# Patient Record
Sex: FEMALE | Race: WHITE | NOT HISPANIC OR LATINO | Employment: OTHER | URBAN - METROPOLITAN AREA
[De-identification: names, ages, dates, MRNs, and addresses within clinical notes are randomized per-mention and may not be internally consistent; named-entity substitution may affect disease eponyms.]

---

## 2021-11-19 ENCOUNTER — NEW PATIENT EMERGENCY (OUTPATIENT)
Dept: URBAN - METROPOLITAN AREA CLINIC 6 | Facility: CLINIC | Age: 71
End: 2021-11-19

## 2021-11-19 DIAGNOSIS — H01.02A: ICD-10-CM

## 2021-11-19 PROCEDURE — 92002 INTRM OPH EXAM NEW PATIENT: CPT

## 2021-11-19 ASSESSMENT — VISUAL ACUITY
OU_CC: J1
OS_CC: 20/40
OU_PH: 20/40
OU_CC: 20/40
OD_CC: 20/40

## 2021-11-19 ASSESSMENT — TONOMETRY
OS_IOP_MMHG: 10
OD_IOP_MMHG: 13

## 2021-12-14 ENCOUNTER — FOLLOW UP (OUTPATIENT)
Dept: URBAN - METROPOLITAN AREA CLINIC 14 | Facility: CLINIC | Age: 71
End: 2021-12-14

## 2021-12-14 DIAGNOSIS — H01.02A: ICD-10-CM

## 2021-12-14 PROCEDURE — 92012 INTRM OPH EXAM EST PATIENT: CPT

## 2021-12-14 ASSESSMENT — TONOMETRY
OS_IOP_MMHG: 11
OD_IOP_MMHG: 13

## 2021-12-14 ASSESSMENT — VISUAL ACUITY
OU_CC: 20/25
OS_CC: 20/40
OD_CC: 20/40-1

## 2022-01-05 ENCOUNTER — FOLLOW UP (OUTPATIENT)
Dept: URBAN - METROPOLITAN AREA CLINIC 14 | Facility: CLINIC | Age: 72
End: 2022-01-05

## 2022-01-05 DIAGNOSIS — H01.02A: ICD-10-CM

## 2022-01-05 DIAGNOSIS — Z96.1: ICD-10-CM

## 2022-01-05 PROCEDURE — 92015 DETERMINE REFRACTIVE STATE: CPT

## 2022-01-05 PROCEDURE — 92012 INTRM OPH EXAM EST PATIENT: CPT

## 2022-01-05 ASSESSMENT — TONOMETRY
OD_IOP_MMHG: 14
OS_IOP_MMHG: 11

## 2022-01-05 ASSESSMENT — VISUAL ACUITY
OD_CC: 20/30
OS_CC: 20/40

## 2022-03-10 ENCOUNTER — ESTABLISHED PATIENT (OUTPATIENT)
Dept: URBAN - METROPOLITAN AREA CLINIC 6 | Facility: CLINIC | Age: 72
End: 2022-03-10

## 2022-03-10 DIAGNOSIS — H01.02A: ICD-10-CM

## 2022-03-10 DIAGNOSIS — Z96.1: ICD-10-CM

## 2022-03-10 PROCEDURE — 92015 DETERMINE REFRACTIVE STATE: CPT

## 2022-03-10 PROCEDURE — 92014 COMPRE OPH EXAM EST PT 1/>: CPT

## 2022-03-10 ASSESSMENT — TONOMETRY
OS_IOP_MMHG: 10
OD_IOP_MMHG: 10

## 2022-06-19 RX ORDER — LOSARTAN POTASSIUM 25 MG/1
TABLET ORAL
COMMUNITY
Start: 2022-02-16 | End: 2022-08-08

## 2022-06-19 RX ORDER — OMEPRAZOLE 40 MG/1
CAPSULE, DELAYED RELEASE ORAL
COMMUNITY

## 2022-06-19 RX ORDER — ALBUTEROL SULFATE 90 UG/1
AEROSOL, METERED RESPIRATORY (INHALATION)
COMMUNITY
End: 2022-08-08

## 2022-06-19 RX ORDER — FENOFIBRATE 160 MG/1
TABLET ORAL
COMMUNITY

## 2022-06-19 RX ORDER — MAGNESIUM OXIDE 400 MG/1
TABLET ORAL
COMMUNITY
Start: 2022-02-03

## 2022-06-19 RX ORDER — ROSUVASTATIN CALCIUM 10 MG/1
TABLET, COATED ORAL
COMMUNITY

## 2022-06-19 RX ORDER — DULOXETIN HYDROCHLORIDE 60 MG/1
1 CAPSULE, DELAYED RELEASE ORAL
COMMUNITY

## 2022-06-19 RX ORDER — CARVEDILOL 6.25 MG/1
6.25 TABLET ORAL
COMMUNITY

## 2022-06-19 RX ORDER — IPRATROPIUM BROMIDE AND ALBUTEROL SULFATE 2.5; .5 MG/3ML; MG/3ML
SOLUTION RESPIRATORY (INHALATION)
COMMUNITY
Start: 2022-04-01

## 2022-07-18 ENCOUNTER — CONSULTATION/EVALUATION (OUTPATIENT)
Dept: URBAN - METROPOLITAN AREA CLINIC 14 | Facility: CLINIC | Age: 72
End: 2022-07-18

## 2022-07-18 DIAGNOSIS — H52.7: ICD-10-CM

## 2022-07-18 PROCEDURE — 99499LKFN LASIK CONSULT NON CANDIDATE

## 2022-07-18 ASSESSMENT — VISUAL ACUITY
OS_CC: 20/40
OD_SC: 20/200
OS_SC: 20/40
OD_CC: 20/25

## 2022-07-18 ASSESSMENT — KERATOMETRY
OD_AXISANGLE_DEGREES: 102
OS_AXISANGLE2_DEGREES: 175
OD_K1POWER_DIOPTERS: 45.25
OS_AXISANGLE_DEGREES: 85
OD_K2POWER_DIOPTERS: 46.75
OS_K1POWER_DIOPTERS: 45.75
OS_K2POWER_DIOPTERS: 46.25
OD_AXISANGLE2_DEGREES: 12

## 2022-09-06 ENCOUNTER — ESTABLISHED PATIENT (OUTPATIENT)
Dept: URBAN - METROPOLITAN AREA CLINIC 14 | Facility: CLINIC | Age: 72
End: 2022-09-06

## 2022-09-06 DIAGNOSIS — H01.02A: ICD-10-CM

## 2022-09-06 DIAGNOSIS — H04.123: ICD-10-CM

## 2022-09-06 DIAGNOSIS — Z96.1: ICD-10-CM

## 2022-09-06 PROCEDURE — 92012 INTRM OPH EXAM EST PATIENT: CPT

## 2022-09-06 PROCEDURE — 92015 DETERMINE REFRACTIVE STATE: CPT

## 2022-09-06 ASSESSMENT — VISUAL ACUITY
OD_GLARE: 20/30
OU_CC: J2
OS_CC: 20/40
OD_CC: 20/30-1
OS_GLARE: 20/40

## 2022-09-06 ASSESSMENT — KERATOMETRY
OS_K1POWER_DIOPTERS: 45.75
OD_K2POWER_DIOPTERS: 46.75
OS_AXISANGLE2_DEGREES: 175
OD_K1POWER_DIOPTERS: 45.25
OS_AXISANGLE_DEGREES: 85
OD_AXISANGLE2_DEGREES: 12
OD_AXISANGLE_DEGREES: 102
OS_K2POWER_DIOPTERS: 46.25

## 2022-09-06 ASSESSMENT — TONOMETRY
OS_IOP_MMHG: 11
OD_IOP_MMHG: 12

## 2023-02-16 PROBLEM — I48.91 ATRIAL FIBRILLATION (HCC): Status: ACTIVE | Noted: 2019-04-10

## 2023-05-10 ENCOUNTER — ESTABLISHED PATIENT (OUTPATIENT)
Dept: URBAN - METROPOLITAN AREA CLINIC 14 | Facility: CLINIC | Age: 73
End: 2023-05-10

## 2023-05-10 DIAGNOSIS — H04.123: ICD-10-CM

## 2023-05-10 DIAGNOSIS — Z96.1: ICD-10-CM

## 2023-05-10 DIAGNOSIS — H01.02A: ICD-10-CM

## 2023-05-10 PROCEDURE — 92014 COMPRE OPH EXAM EST PT 1/>: CPT

## 2023-05-10 ASSESSMENT — VISUAL ACUITY
OU_CC: 20/20
OD_CC: 20/30
OS_CC: 20/20

## 2023-05-10 ASSESSMENT — KERATOMETRY
OD_AXISANGLE2_DEGREES: 12
OS_K1POWER_DIOPTERS: 45.75
OS_AXISANGLE2_DEGREES: 175
OS_AXISANGLE_DEGREES: 85
OD_AXISANGLE_DEGREES: 102
OD_K2POWER_DIOPTERS: 46.75
OD_K1POWER_DIOPTERS: 45.25
OS_K2POWER_DIOPTERS: 46.25

## 2023-05-10 ASSESSMENT — TONOMETRY
OS_IOP_MMHG: 8
OD_IOP_MMHG: 9

## 2023-06-15 NOTE — PATIENT DISCUSSION
Recommended warm compresses. V-Y Flap Text: The defect edges were debeveled with a #15 scalpel blade.  Given the location of the defect, shape of the defect and the proximity to free margins a V-Y flap was deemed most appropriate.  Using a sterile surgical marker, an appropriate advancement flap was drawn incorporating the defect and placing the expected incisions within the relaxed skin tension lines where possible.    The area thus outlined was incised deep to adipose tissue with a #15 scalpel blade.  The skin margins were undermined to an appropriate distance in all directions utilizing iris scissors.

## 2023-09-01 PROBLEM — I42.9 CARDIOMYOPATHY: Status: ACTIVE | Noted: 2023-03-24

## 2023-09-01 PROBLEM — J43.2 CENTRILOBULAR EMPHYSEMA (HCC): Status: ACTIVE | Noted: 2023-09-01

## 2023-09-01 PROBLEM — J44.9 CHRONIC OBSTRUCTIVE LUNG DISEASE (HCC): Status: ACTIVE | Noted: 2023-09-01

## 2023-09-01 PROBLEM — J18.9 PNEUMONIA DUE TO INFECTIOUS ORGANISM: Status: ACTIVE | Noted: 2018-01-22

## 2023-09-01 PROBLEM — R52 ALTERATION IN COMFORT ASSOCIATED WITH PAIN: Status: ACTIVE | Noted: 2023-09-01

## 2023-09-01 PROBLEM — J30.81 ALLERGIC RHINITIS DUE TO ANIMAL HAIR AND DANDER: Status: ACTIVE | Noted: 2023-09-01

## 2023-09-01 PROBLEM — F40.243 ANXIETY WITH FLYING: Status: ACTIVE | Noted: 2023-09-01

## 2023-09-01 PROBLEM — I25.10 CORONARY ARTERY DISEASE INVOLVING NATIVE CORONARY ARTERY OF NATIVE HEART WITHOUT ANGINA PECTORIS: Status: ACTIVE | Noted: 2023-09-01

## 2023-09-01 PROBLEM — Z15.89: Status: ACTIVE | Noted: 2023-08-03

## 2023-09-01 PROBLEM — J44.89 COPD (CHRONIC OBSTRUCTIVE PULMONARY DISEASE) WITH CHRONIC BRONCHITIS (HCC): Status: ACTIVE | Noted: 2023-09-01

## 2023-09-01 PROBLEM — J32.9 CHRONIC SINUSITIS: Status: ACTIVE | Noted: 2023-09-01

## 2023-09-01 PROBLEM — E83.42 HYPOMAGNESEMIA: Status: ACTIVE | Noted: 2023-09-01

## 2023-09-01 PROBLEM — R09.89 DECREASED CARDIAC OUTPUT: Status: ACTIVE | Noted: 2023-09-01

## 2023-09-01 PROBLEM — Z95.0 PRESENCE OF CARDIAC PACEMAKER: Status: ACTIVE | Noted: 2023-03-26

## 2023-09-01 PROBLEM — M75.02 ADHESIVE CAPSULITIS OF LEFT SHOULDER: Status: ACTIVE | Noted: 2023-09-01

## 2023-09-01 PROBLEM — Z98.890 POSTOPERATIVE NAUSEA AND VOMITING: Status: ACTIVE | Noted: 2023-09-01

## 2023-09-01 PROBLEM — I65.21 STENOSIS OF RIGHT CAROTID ARTERY: Status: ACTIVE | Noted: 2023-09-01

## 2023-09-01 PROBLEM — M20.42 HAMMERTOE OF SECOND TOE OF LEFT FOOT: Status: ACTIVE | Noted: 2023-09-01

## 2023-09-01 PROBLEM — M17.9 OSTEOARTHRITIS OF KNEE: Status: ACTIVE | Noted: 2023-09-01

## 2023-09-01 PROBLEM — I10 ESSENTIAL HYPERTENSION: Status: ACTIVE | Noted: 2023-09-01

## 2023-09-01 PROBLEM — R91.1 PULMONARY NODULE: Status: ACTIVE | Noted: 2023-09-01

## 2023-09-01 PROBLEM — R11.2 POSTOPERATIVE NAUSEA AND VOMITING: Status: ACTIVE | Noted: 2023-09-01

## 2023-09-01 PROBLEM — I11.0 HYPERTENSIVE HEART DISEASE WITH HEART FAILURE (HCC): Status: ACTIVE | Noted: 2023-09-01

## 2023-09-01 PROBLEM — K21.9 GASTRO-ESOPHAGEAL REFLUX DISEASE WITHOUT ESOPHAGITIS: Status: ACTIVE | Noted: 2023-09-01

## 2023-09-01 PROBLEM — Z91.89 AT RISK FOR INFECTION: Status: ACTIVE | Noted: 2023-09-01

## 2023-09-01 PROBLEM — M19.041 PRIMARY OSTEOARTHRITIS, RIGHT HAND: Status: ACTIVE | Noted: 2023-09-01

## 2023-09-01 PROBLEM — A31.9 MYCOBACTERIAL INFECTION, NON-TB: Status: ACTIVE | Noted: 2023-08-29

## 2023-09-01 PROBLEM — E78.5 HYPERLIPIDEMIA: Status: ACTIVE | Noted: 2023-09-01

## 2023-09-01 PROBLEM — M85.80 OSTEOPENIA: Status: ACTIVE | Noted: 2023-09-01

## 2023-09-01 PROBLEM — F51.01 PRIMARY INSOMNIA: Status: ACTIVE | Noted: 2023-09-01

## 2023-09-01 PROBLEM — J30.2 SEASONAL ALLERGIES: Status: ACTIVE | Noted: 2023-09-01

## 2023-09-22 PROBLEM — G56.02 CARPAL TUNNEL SYNDROME, LEFT: Status: ACTIVE | Noted: 2023-09-22

## 2023-09-22 PROBLEM — G56.01 CARPAL TUNNEL SYNDROME, RIGHT: Status: ACTIVE | Noted: 2023-09-22

## 2023-09-22 PROBLEM — G56.22 CUBITAL TUNNEL SYNDROME, LEFT: Status: ACTIVE | Noted: 2023-09-22

## 2024-01-29 PROBLEM — I10 HTN (HYPERTENSION): Status: RESOLVED | Noted: 2023-09-01 | Resolved: 2024-01-29

## 2024-01-29 PROBLEM — I11.0 HYPERTENSIVE HEART DISEASE WITH HEART FAILURE (HCC): Status: RESOLVED | Noted: 2023-09-01 | Resolved: 2024-01-29

## 2024-04-16 ENCOUNTER — ESTABLISHED PATIENT (OUTPATIENT)
Dept: URBAN - METROPOLITAN AREA CLINIC 14 | Facility: CLINIC | Age: 74
End: 2024-04-16

## 2024-04-16 DIAGNOSIS — H04.123: ICD-10-CM

## 2024-04-16 DIAGNOSIS — Z98.890: ICD-10-CM

## 2024-04-16 DIAGNOSIS — A31.2: ICD-10-CM

## 2024-04-16 DIAGNOSIS — H01.02A: ICD-10-CM

## 2024-04-16 DIAGNOSIS — H43.813: ICD-10-CM

## 2024-04-16 DIAGNOSIS — Z96.1: ICD-10-CM

## 2024-04-16 PROCEDURE — 99214 OFFICE O/P EST MOD 30 MIN: CPT

## 2024-04-16 PROCEDURE — 92015 DETERMINE REFRACTIVE STATE: CPT

## 2024-04-16 PROCEDURE — 92134 CPTRZ OPH DX IMG PST SGM RTA: CPT

## 2024-04-16 ASSESSMENT — VISUAL ACUITY
OD_CC: 20/60-1
OS_CC: 20/25
OD_PH: 20/40
OU_CC: 20/25

## 2024-04-16 ASSESSMENT — TONOMETRY
OD_IOP_MMHG: 12
OS_IOP_MMHG: 10

## 2024-04-16 ASSESSMENT — KERATOMETRY
OS_K1POWER_DIOPTERS: 45.75
OS_K2POWER_DIOPTERS: 46.25
OD_AXISANGLE2_DEGREES: 12
OS_AXISANGLE_DEGREES: 85
OD_K2POWER_DIOPTERS: 46.75
OD_K1POWER_DIOPTERS: 45.25
OS_AXISANGLE2_DEGREES: 175
OD_AXISANGLE_DEGREES: 102

## 2025-03-11 PROBLEM — R10.31 ABDOMINAL PAIN, RIGHT LOWER QUADRANT: Status: ACTIVE | Noted: 2025-03-11

## 2025-03-11 PROBLEM — K66.8 PNEUMOPERITONEUM: Status: ACTIVE | Noted: 2025-03-11

## 2025-03-21 ENCOUNTER — TELEPHONE (OUTPATIENT)
Dept: PHARMACY | Facility: CLINIC | Age: 75
End: 2025-03-21

## 2025-03-21 NOTE — TELEPHONE ENCOUNTER
CLINICAL PHARMACY NOTE - [facility] Pharmacy Referral    Patient can be scheduled with:  Team Schedule- General Referrals, etc.    Received a referral from: Care Coordinator to discuss patient’s medications. Called patient to schedule a time to speak with a pharmacist over the telephone.     No answer left VM: Please contact us at  [phone] option 1 to schedule this appointment.    Aida Del Rosario CPhT.   [facility] Clinical   [facility]  Toll free: [phone] Option 1    Patient is located in [place]. Please schedule with [place] Pharmacists, [place] or Mary Anne GABRIEL for licensing purposes.

## 2025-03-21 NOTE — TELEPHONE ENCOUNTER
----- Message from ALIA HERNANDEZ RN sent at 3/21/2025 12:21 PM EDT -----  Requesting referral to PharmD to review medications with patient, as she's concerned about polypharmacy and would like to come off some of them if possible. 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 She wonders if she really needs to take gabapentin.

## 2025-03-27 NOTE — TELEPHONE ENCOUNTER
2nd attempt to contact this patient regarding the previous message    CLINICAL PHARMACY: referral  Patient unavailable at the time of call. Left following message on home TAD: please call back at toll-free [phone] to retrieve previous message.    Patient is located in [place]. Please schedule with [place] Pharmacists, [place] or Mary Anne GABRIEL for licensing purposes.     [employer] message sent to patient.  If unread, letter will be mailed to home.      Nataly Henry CPhT  Population Health    [facility]   [phone] option 1     For Pharmacy Admin Tracking Only    Program: Pop Health  CPA in place:  No  Gap Closed?: No   Time Spent (min): 15

## 2025-03-28 NOTE — TELEPHONE ENCOUNTER
Patient returned call to schedule Pharmacy medication review . She is scheduled for 03.31.25 @ 11:00 am. Patient is located in [place].    Saira Martinez CPhT.  [facility] Clinical   [facility]  Toll free: [phone] Option 1     For Pharmacy Admin Tracking Only    Program: [employer]  CPA in place:  No  Recommendation Provided To: Patient/Caregiver: 1 via Telephone  Intervention Detail: Scheduled Appointment  Intervention Accepted By: Patient/Caregiver: 1  Gap Closed?: Yes   Time Spent (min): 5

## 2025-03-31 ENCOUNTER — TELEPHONE (OUTPATIENT)
Dept: PHARMACY | Facility: CLINIC | Age: 75
End: 2025-03-31

## 2025-03-31 PROBLEM — N18.30 ANEMIA OF CHRONIC KIDNEY FAILURE, STAGE 3 (MODERATE): Status: ACTIVE | Noted: 2025-03-31

## 2025-03-31 PROBLEM — D63.1 ANEMIA OF CHRONIC KIDNEY FAILURE, STAGE 3 (MODERATE): Status: ACTIVE | Noted: 2025-03-31

## 2025-03-31 RX ORDER — ALBUTEROL SULFATE 90 UG/1
2 INHALANT RESPIRATORY (INHALATION) EVERY 6 HOURS PRN
COMMUNITY

## 2025-03-31 NOTE — TELEPHONE ENCOUNTER
Verónica oChen MD, medication review completed with patient:  - Patient concerned for polypharmacy  - Med review completed: could consider updated lipid panel and stopping fenofibrate and increasing rosuvastatin if needed. Could possibly consider titrating down and off zoloft since on duloxetine for pain but could potentially treat her anxiety/depression.     Patient has appointment with you today and would like to discuss further.     See note below for complete details.     Thank you,  Petrona Kilpatrick, PharmD, [facility]  [facility] Clinical Pharmacist  Department: [phone]  =======================================================    CLINICAL PHARMACY NOTE - Medication Review  Patient outreach to review medications - Spoke with patient.      SUBJECTIVE/OBJECTIVE:   Marilia Laughlin is a 75 y.o. female referred to a clinical pharmacy specialist by care coordination given their history of concern for polypharmacy per patient.    Medications:  Current Outpatient Medications   Medication Instructions    acetaminophen (TYLENOL) 500 mg, EVERY 6 HOURS PRN    Azelastine HCl 137 MCG/SPRAY SOLN 1 spray, DAILY PRN  No longer using    azithromycin (ZITHROMAX) 250 mg, DAILY   On for hx MAC    Cholecalciferol (VITAMIN D3) 1 capsule, Oral, EVERY MORNING, TABLET    DULoxetine (CYMBALTA) 60 mg, Oral, DAILY   Taking for arthritis and it is working for her    Eliquis 5 mg, Oral, 2 TIMES DAILY   For afib    EPINEPHrine (EPIPEN) 0.3 mg, PRN    fenofibrate 160 MG tablet TAKE ONE TABLET BY MOUTH ONCE DAILY   Get updated lipid panel    FLUTICASONE PROPIONATE, NASAL, NA 1 spray by Nasal route every morning    fluticasone-umeclidin-vilant (TRELEGY ELLIPTA) 100-62.5-25 MCG/ACT AEPB inhaler 1 puff, Inhalation, DAILY    furosemide (LASIX) 40 mg, Oral, DAILY PRN    gabapentin (NEURONTIN) 100 mg, Oral, 3 TIMES DAILY   Had hole in colon from colonoscopy and was started on this for that due to extreme

## 2025-04-18 ENCOUNTER — COMPREHENSIVE EXAM (OUTPATIENT)
Age: 75
End: 2025-04-18

## 2025-04-18 DIAGNOSIS — Z79.899: ICD-10-CM

## 2025-04-18 PROCEDURE — 92014 COMPRE OPH EXAM EST PT 1/>: CPT

## 2025-04-18 PROCEDURE — 92133 CPTRZD OPH DX IMG PST SGM ON: CPT
